# Patient Record
Sex: FEMALE | Race: WHITE
[De-identification: names, ages, dates, MRNs, and addresses within clinical notes are randomized per-mention and may not be internally consistent; named-entity substitution may affect disease eponyms.]

---

## 2019-12-01 ENCOUNTER — HOSPITAL ENCOUNTER (EMERGENCY)
Dept: HOSPITAL 25 - ED | Age: 29
Discharge: HOME | End: 2019-12-01
Payer: SELF-PAY

## 2019-12-01 VITALS — DIASTOLIC BLOOD PRESSURE: 78 MMHG | SYSTOLIC BLOOD PRESSURE: 115 MMHG

## 2019-12-01 DIAGNOSIS — O03.9: Primary | ICD-10-CM

## 2019-12-01 LAB
ALBUMIN SERPL BCG-MCNC: 4.6 G/DL (ref 3.2–5.2)
ALBUMIN/GLOB SERPL: 1.6 {RATIO} (ref 1–3)
ALP SERPL-CCNC: 38 U/L (ref 34–104)
ALT SERPL W P-5'-P-CCNC: 17 U/L (ref 7–52)
ANION GAP SERPL CALC-SCNC: 7 MMOL/L (ref 2–11)
APTT PPP: 36.1 SECONDS (ref 26–38)
AST SERPL-CCNC: 15 U/L (ref 13–39)
BASOPHILS # BLD AUTO: 0 10^3/UL (ref 0–0.2)
BUN SERPL-MCNC: 9 MG/DL (ref 6–24)
BUN/CREAT SERPL: 16.1 (ref 8–20)
CALCIUM SERPL-MCNC: 9.4 MG/DL (ref 8.6–10.3)
CHLORIDE SERPL-SCNC: 102 MMOL/L (ref 101–111)
EOSINOPHIL # BLD AUTO: 0.2 10^3/UL (ref 0–0.6)
GLOBULIN SER CALC-MCNC: 2.9 G/DL (ref 2–4)
GLUCOSE SERPL-MCNC: 118 MG/DL (ref 70–100)
HCO3 SERPL-SCNC: 25 MMOL/L (ref 22–32)
HCT VFR BLD AUTO: 39 % (ref 35–47)
HGB BLD-MCNC: 13.7 G/DL (ref 12–16)
INR PPP/BLD: 1.07 (ref 0.82–1.09)
LYMPHOCYTES # BLD AUTO: 0.9 10^3/UL (ref 1–4.8)
MCH RBC QN AUTO: 30 PG (ref 27–31)
MCHC RBC AUTO-ENTMCNC: 36 G/DL (ref 31–36)
MCV RBC AUTO: 85 FL (ref 80–97)
MONOCYTES # BLD AUTO: 0.6 10^3/UL (ref 0–0.8)
NEUTROPHILS # BLD AUTO: 8.4 10^3/UL (ref 1.5–7.7)
NRBC # BLD AUTO: 0 10^3/UL
NRBC BLD QL AUTO: 0
PLATELET # BLD AUTO: 186 10^3/UL (ref 150–450)
POTASSIUM SERPL-SCNC: 3.8 MMOL/L (ref 3.5–5)
PROT SERPL-MCNC: 7.5 G/DL (ref 6.4–8.9)
RBC # BLD AUTO: 4.54 10^6 /UL (ref 3.7–4.87)
SODIUM SERPL-SCNC: 134 MMOL/L (ref 135–145)
WBC # BLD AUTO: 10.1 10^3/UL (ref 3.5–10.8)

## 2019-12-01 PROCEDURE — 99282 EMERGENCY DEPT VISIT SF MDM: CPT

## 2019-12-01 PROCEDURE — 85730 THROMBOPLASTIN TIME PARTIAL: CPT

## 2019-12-01 PROCEDURE — 85025 COMPLETE CBC W/AUTO DIFF WBC: CPT

## 2019-12-01 PROCEDURE — 36415 COLL VENOUS BLD VENIPUNCTURE: CPT

## 2019-12-01 PROCEDURE — 88305 TISSUE EXAM BY PATHOLOGIST: CPT

## 2019-12-01 PROCEDURE — 86901 BLOOD TYPING SEROLOGIC RH(D): CPT

## 2019-12-01 PROCEDURE — 76817 TRANSVAGINAL US OBSTETRIC: CPT

## 2019-12-01 PROCEDURE — 84702 CHORIONIC GONADOTROPIN TEST: CPT

## 2019-12-01 PROCEDURE — 86850 RBC ANTIBODY SCREEN: CPT

## 2019-12-01 PROCEDURE — 86900 BLOOD TYPING SEROLOGIC ABO: CPT

## 2019-12-01 PROCEDURE — 80053 COMPREHEN METABOLIC PANEL: CPT

## 2019-12-01 PROCEDURE — 83605 ASSAY OF LACTIC ACID: CPT

## 2019-12-01 PROCEDURE — 85610 PROTHROMBIN TIME: CPT

## 2019-12-01 NOTE — ED
Abdominal Pain/Female





- HPI Summary


HPI Summary: 





29-year-old  female with a significant past medical history of a 

miscarriage in the past presents to emergency department today complaining of 

having heavy vaginal bleeding which began around midnight last night.  Patient 

also reports having a cramping sensation intermittently which began with 

spotting yesterday afternoon.  She is currently not complaining of any pain and 

states the vaginal bleeding is dark red.  She denies alcohol, smoking, 

recreational drug use.  She denies recent trauma, fever, shortness of breath, 

lightheadedness, chest pain, rash.  Patient does not take any anticoagulants.

















- History of Current Complaint


Chief Complaint: EDOBProblems


Stated Complaint: BLEEDING PER PT


Hx Obtained From: Patient


Pregnant?: Yes


Onset/Duration: Gradual Onset, Lasting Hours


Timing: Constant


Severity Initially: Mild


Severity Currently: Moderate


Pain Intensity: 0


Pain Scale Used: 0-10 Numeric


Location: Suprapubic


Radiates: No


Character: Cramping


Associated Signs and Symptoms: Positive: Vaginal Bleeding.  Negative: 

Diaphoresis, Fever, Cough, Chest Pain, Back Pain, Blood in Stool, Urinary 

Symptoms, Vaginal Discharge, Nausea, Vomiting


Simlar Episode/Dx as:: hx of miscarriage


Allergies/Adverse Reactions: 


 Allergies











Allergy/AdvReac Type Severity Reaction Status Date / Time


 


No Known Allergies Allergy   Verified 19 05:41














PMH/Surg Hx/FS Hx/Imm Hx


 History: Reports: Other  Problems/Disorders - history of miscarriage 2


Infectious Disease History: No


Infectious Disease History: 


   Denies: Traveled Outside the [place] in Last 30 Days





Review of Systems


Constitutional: Negative


Eyes: Negative


ENT: Negative


Cardiovascular: Negative


Respiratory: Negative


Gastrointestinal: Negative


Positive: see HPI


Musculoskeletal: Negative


Skin: Negative


Neurological: Negative


Psychological: Normal


All Other Systems Reviewed And Are Negative: Yes





Physical Exam


Triage Information Reviewed: Yes


Vital Signs On Initial Exam: 


 Initial Vitals











Temp Pulse Resp BP Pulse Ox


 


 98.6 F   88   15   112/77   100 


 


 19 05:38  19 05:38  19 05:38  19 05:38  19 05:38











Vital Signs Reviewed: Yes


Appearance: Positive: Well-Appearing, No Pain Distress, Well-Nourished


Skin: Positive: Warm, Skin Color Reflects Adequate Perfusion


Eyes: Positive: EOMI, AVILA


ENT: Positive: Hearing grossly normal


Respiratory/Lung Sounds: Positive: Clear to Auscultation, Breath Sounds Present


Cardiovascular: Positive: RRR, S1, S2


Abdomen Description: Positive: Soft, Other: - Mild tenderness with palpation of 

the suprapubic region.  No fetal heart sounds could be auscultated with a 

stethoscope.  There is an increase in fundal size consistent with recent 

pregnancy.


Bowel Sounds: Positive: Present


Pelvic Exam: Positive: External Exam Normal, No Cerv. Motion Tender, Active 

Bleeding, Blood.  Negative: Cervicitis, Lesions, Mass, Ulcers


Musculoskeletal: Positive: Normal, Strength/ROM Intact


Neurological: Positive: Sensory/Motor Intact, Normal Gait, Speech Normal


Psychiatric: Positive: Normal


AVPU Assessment: Alert





Procedures





- Sedation


Patient Received Moderate/Deep Sedation with Procedure: No





Diagnostics





- Vital Signs


 Vital Signs











  Temp Pulse Resp BP Pulse Ox


 


 19 05:38  98.6 F  88  15  112/77  100














- Laboratory


Result Diagrams: 


 19 05:58





 19 05:58


Lab Statement: Any lab studies that have been ordered have been reviewed, and 

results considered in the medical decision making process.





Abdominal Pain Fem Course/Dx





- Course


Course Of Treatment: Patient was evaluated in the emergency department for 

possible miscarriage.  Patient is 11.5 weeks pregnant.  The patient was seen 

and examined.  Her vital signs are stable and she is afebrile and 

hemodynamically stable.  Laboratory studies were drawn which showed No evidence 

of leukocytosis. H&H is stable at 13.7/39.there are no significant electrolyte 

abnormalities noted.  Urinalysis is negative for UTI however there is blood 

present which is consistent with patient complaint. Type and screen was done 

which revealed O+ blood type and RhoGAM was deemed unnecessary for this 

patient. Pelvic exam was done and chaperoned by Dav Sanchez hospital aide.  

Inspection of the external genitalia showed no lesions however there was blood 

noted in the perianal area, medial thighs.  Speculum exam revealed copious 

amount of blood in the vaginal canal with possible products of conception.  

Appeared to be mild cervical dilation.  Patient tolerated exam well with no 

pain.  Blood clot was sent to the lab for pathology report as per 

recommendation from OB/GYN, Dr. Fernández.  Dr. Fernández was consulted at 0901 and 

agreed patient symptoms are consistent with A complete .  She stated 

the patient needs a GYN follow-up in one week.  Patient is stable to go home 

and follow-up as an outpatient.





- Diagnoses


Differential Diagnosis: Positive: Ectopic Pregnancy, Ovarian Cyst, Pregnancy, 

Other - 


Provider Diagnoses: 


 Complete 








- Provider Notifications


Discussed Care Of Patient With: Maggie Fernández -  patient is stable to follow 

up as an outpatient with her GYN in one week.


Time Discussed With Above Provider: 09:01





Discharge ED





- Sign-Out/Discharge


Documenting (check all that apply): Patient Departure





- Discharge Plan


Condition: Stable


Disposition: HOME


Patient Education Materials:  Miscarriage (ED)


Referrals: 


No Primary Care Phys,[facility] [Primary Care Provider] - 


Maggie Fernández MD [Medical Doctor] - 7 Days


Additional Instructions: 


You were seen in the emergency department and diagnosed with a complete 

.  You're medically well enough to follow up with your midwife within 

one week for further evaluation and management of your symptoms.  Until then 

please expect mild vaginal bleeding as this miscarriage resolves.  Please 

return to the emergency department if you develop any new or worsening symptoms 

including a fever over 101, bleeding more than 1 pad an hour.  Please refrain 

from intercourse and practice pelvic rest and do not insert anything into her 

vagina including tampons for 4 weeks to allow the cervix to relax.





- Billing Disposition and Condition


Condition: STABLE


Disposition: Home